# Patient Record
Sex: MALE | Race: OTHER | NOT HISPANIC OR LATINO | URBAN - METROPOLITAN AREA
[De-identification: names, ages, dates, MRNs, and addresses within clinical notes are randomized per-mention and may not be internally consistent; named-entity substitution may affect disease eponyms.]

---

## 2018-02-21 ENCOUNTER — OUTPATIENT (OUTPATIENT)
Dept: OUTPATIENT SERVICES | Facility: HOSPITAL | Age: 68
LOS: 1 days | Discharge: HOME | End: 2018-02-21

## 2018-02-21 VITALS
DIASTOLIC BLOOD PRESSURE: 90 MMHG | HEART RATE: 93 BPM | RESPIRATION RATE: 18 BRPM | TEMPERATURE: 98 F | OXYGEN SATURATION: 96 % | HEIGHT: 73 IN | WEIGHT: 266.76 LBS | SYSTOLIC BLOOD PRESSURE: 149 MMHG

## 2018-02-21 RX ORDER — ASPIRIN/CALCIUM CARB/MAGNESIUM 324 MG
324 TABLET ORAL ONCE
Qty: 0 | Refills: 0 | Status: COMPLETED | OUTPATIENT
Start: 2018-02-21 | End: 2018-02-21

## 2018-02-21 RX ORDER — SODIUM CHLORIDE 9 MG/ML
1000 INJECTION INTRAMUSCULAR; INTRAVENOUS; SUBCUTANEOUS
Qty: 0 | Refills: 0 | Status: DISCONTINUED | OUTPATIENT
Start: 2018-02-21 | End: 2018-03-08

## 2018-02-21 RX ORDER — MELOXICAM 15 MG/1
1 TABLET ORAL
Qty: 0 | Refills: 0 | COMMUNITY

## 2018-02-21 RX ADMIN — Medication 324 MILLIGRAM(S): at 08:56

## 2018-02-21 RX ADMIN — SODIUM CHLORIDE 75 MILLILITER(S): 9 INJECTION INTRAMUSCULAR; INTRAVENOUS; SUBCUTANEOUS at 08:57

## 2018-02-21 NOTE — H&P CARDIOLOGY - HISTORY OF PRESENT ILLNESS
Patient is a 67y Male who presents to the cardiology department for elective cardiac catherization.     SUBJ: 68 y/o M with PMH HTN, spinal stenosis, R knee surgery 2009 here for elective LHC for clearance for neck surgery. Pt had recent abnormal nuclear stress imaging revealing large fixed inferior defect and EKG showing poor R wave progression    REVIEW OF SYSTEMS:  CONSTITUTIONAL: No fever, weight loss, or fatigue  CARDIOLOGY: PAtient denies chest pain, shortness of breath or syncopal episodes.   RESPIRATORY: denies shortness of breath, wheezeing.   NEUROLOGICAL: NO weakness, no focal deficits to report.  ENDOCRINOLOGICAL: no recent change in diabetic medications.   GI: no BRBPR, no N,V,diarrhea.     PHYSICAL EXAM:  · CONSTITUTIONAL:	Well-developed, well nourished    BMI-  ·RESPIRATORY:   airway patent; breath sounds equal; good air movement; respirations non-labored; clear to auscultation bilaterally; no chest wall tenderness; no intercostal retractions; no rales,rhonchi or wheeze  · CARDIOVASCULAR	regular rate and rhythm  no rub    normal PMI  · EXTREMITIES: No cyanosis, clubbing or edema  · VASCULAR: 	Equal and normal pulses (carotid, femoral, dorsalis pedis)

## 2018-03-06 DIAGNOSIS — R94.39 ABNORMAL RESULT OF OTHER CARDIOVASCULAR FUNCTION STUDY: ICD-10-CM

## 2018-03-06 DIAGNOSIS — E78.4 OTHER HYPERLIPIDEMIA: ICD-10-CM

## 2018-03-06 DIAGNOSIS — I10 ESSENTIAL (PRIMARY) HYPERTENSION: ICD-10-CM

## 2018-03-06 DIAGNOSIS — Z87.891 PERSONAL HISTORY OF NICOTINE DEPENDENCE: ICD-10-CM

## 2018-05-02 ENCOUNTER — OUTPATIENT (OUTPATIENT)
Dept: OUTPATIENT SERVICES | Facility: HOSPITAL | Age: 68
LOS: 1 days | Discharge: HOME | End: 2018-05-02

## 2018-05-02 DIAGNOSIS — M54.2 CERVICALGIA: ICD-10-CM
